# Patient Record
Sex: MALE | Race: WHITE | URBAN - METROPOLITAN AREA
[De-identification: names, ages, dates, MRNs, and addresses within clinical notes are randomized per-mention and may not be internally consistent; named-entity substitution may affect disease eponyms.]

---

## 2018-12-16 ENCOUNTER — HOSPITAL ENCOUNTER (OUTPATIENT)
Facility: CLINIC | Age: 63
End: 2018-12-16
Attending: INTERNAL MEDICINE | Admitting: INTERNAL MEDICINE

## 2018-12-16 ENCOUNTER — HOSPITAL ENCOUNTER (EMERGENCY)
Facility: CLINIC | Age: 63
Discharge: HOME | End: 2018-12-16
Attending: EMERGENCY MEDICINE | Admitting: EMERGENCY MEDICINE
Payer: COMMERCIAL

## 2018-12-16 ENCOUNTER — APPOINTMENT (OUTPATIENT)
Dept: GENERAL RADIOLOGY | Facility: CLINIC | Age: 63
End: 2018-12-16
Attending: EMERGENCY MEDICINE
Payer: COMMERCIAL

## 2018-12-16 VITALS
OXYGEN SATURATION: 97 % | BODY MASS INDEX: 23.54 KG/M2 | WEIGHT: 150 LBS | HEART RATE: 56 BPM | HEIGHT: 67 IN | RESPIRATION RATE: 18 BRPM | SYSTOLIC BLOOD PRESSURE: 130 MMHG | DIASTOLIC BLOOD PRESSURE: 74 MMHG | TEMPERATURE: 98 F

## 2018-12-16 DIAGNOSIS — T18.9XXA SWALLOWED FOREIGN BODY, INITIAL ENCOUNTER: ICD-10-CM

## 2018-12-16 DIAGNOSIS — T18.2XXA FOREIGN BODY IN STOMACH: ICD-10-CM

## 2018-12-16 LAB — UPPER GI ENDOSCOPY: NORMAL

## 2018-12-16 PROCEDURE — 71046 X-RAY EXAM CHEST 2 VIEWS: CPT

## 2018-12-16 PROCEDURE — 74019 RADEX ABDOMEN 2 VIEWS: CPT

## 2018-12-16 PROCEDURE — 99285 EMERGENCY DEPT VISIT HI MDM: CPT | Mod: Z6 | Performed by: EMERGENCY MEDICINE

## 2018-12-16 PROCEDURE — 25000132 ZZH RX MED GY IP 250 OP 250 PS 637: Performed by: INTERNAL MEDICINE

## 2018-12-16 PROCEDURE — 25000128 H RX IP 250 OP 636: Performed by: INTERNAL MEDICINE

## 2018-12-16 PROCEDURE — G0500 MOD SEDAT ENDO SERVICE >5YRS: HCPCS | Performed by: INTERNAL MEDICINE

## 2018-12-16 PROCEDURE — 43247 EGD REMOVE FOREIGN BODY: CPT | Performed by: INTERNAL MEDICINE

## 2018-12-16 PROCEDURE — 99285 EMERGENCY DEPT VISIT HI MDM: CPT | Mod: 25

## 2018-12-16 RX ORDER — FENTANYL CITRATE 50 UG/ML
INJECTION, SOLUTION INTRAMUSCULAR; INTRAVENOUS PRN
Status: DISCONTINUED | OUTPATIENT
Start: 2018-12-16 | End: 2018-12-16 | Stop reason: HOSPADM

## 2018-12-16 RX ORDER — SIMETHICONE
LIQUID (ML) MISCELLANEOUS PRN
Status: DISCONTINUED | OUTPATIENT
Start: 2018-12-16 | End: 2018-12-16 | Stop reason: HOSPADM

## 2018-12-16 ASSESSMENT — MIFFLIN-ST. JEOR: SCORE: 1434.03

## 2018-12-16 NOTE — ED AVS SNAPSHOT
[facility], [place], Emergency Department  [address] [place] [id number]  Phone:  [phone]                                    Serafin Garcia   MRN: [id number]    Department:  [facility], Emergency Department   Date of Visit:  12/16/2018           After Visit Summary Signature Page    I have received my discharge instructions, and my questions have been answered. I have discussed any challenges I see with this plan with the nurse or doctor.    ..........................................................................................................................................  Patient/Patient Representative Signature      ..........................................................................................................................................  Patient Representative Print Name and Relationship to Patient    ..................................................               ................................................  Date                                   Time    ..........................................................................................................................................  Reviewed by Signature/Title    ...................................................              ..............................................  Date                                               Time          22EPIC Rev 08/18

## 2018-12-16 NOTE — OR NURSING
Procedure: EGD with foreign body removal. Toothpick was retrieved via snare and grimaldo protector.  Sedation: Conscious  O2: 2L  Tolerated Procedure: Well  Patient returned to recovery room in stable condition with RN  Other: ER called with report  Vicky Ortiz RN

## 2018-12-16 NOTE — DISCHARGE INSTRUCTIONS
Please make an appointment to follow up with Your Primary Care Provider as needed.    Return to the emergency department for any problems.

## 2018-12-16 NOTE — ED NOTES
"Bed: ED23  Expected date:   Expected time:   Means of arrival:   Comments:  Hold for patient in endoscopy.     Serafin Garcia  MRN [id number]    Had tooth pick removed. Left plastic \"tie\" in the stomach.     Received 2 mg Versed and 50 mcg of Fentanyl.       "

## 2018-12-16 NOTE — ED PROVIDER NOTES
"  History     Chief Complaint   Patient presents with     Swallowed Foreign Body     HPI  Serafin Garcia is a 63 year old male who presents for evaluation of a swallowed foreign body. Patient believes he swallowed a wooden, double-pointed toothpick -- \"one moment it was on the plate, the next moment I felt something sharp in my throat and poke my glottis\". Currently, he is asymptomatic, denying hemoptysis or melena (though he notes he has yet to pass stool since). Patient called the \"[facility]\" and was urged to seek emergent care.    No past medical history on file.    No past surgical history on file.    No family history on file.    Social History     Tobacco Use     Smoking status: Never Smoker     Smokeless tobacco: Never Used   Substance Use Topics     Alcohol use: Yes     Comment: socially       No current facility-administered medications for this encounter.      No current outpatient medications on file.        Allergies   Allergen Reactions     Nsaids      Penicillins      I have reviewed the Medications, Allergies, Past Medical and Surgical History, and Social History in the Epic system.    Review of Systems   All other systems reviewed and are negative.      Physical Exam   BP: 128/76  Pulse: 59  Heart Rate: 54  Temp: 98  F (36.7  C)  Resp: 12  Height: 170.2 cm (5' 7\")  Weight: 68 kg (150 lb)  SpO2: 100 %      Physical Exam   Constitutional: He is oriented to person, place, and time. He appears well-developed and well-nourished.  Non-toxic appearance. He does not appear ill. No distress.   HENT:   Head: Normocephalic and atraumatic.   Eyes: EOM are normal. Pupils are equal, round, and reactive to light. No scleral icterus.   Neck: Normal range of motion. Neck supple.   Cardiovascular: Normal rate.   Pulmonary/Chest: Effort normal and breath sounds normal. No stridor. He has no wheezes.   Abdominal: Soft. Bowel sounds are normal. There is no tenderness. There is no rebound and no guarding. "   Neurological: He is alert and oriented to person, place, and time. He has normal strength. No sensory deficit.   Skin: Skin is warm and dry. No rash noted. No pallor.   Psychiatric: He has a normal mood and affect. His behavior is normal.   Nursing note and vitals reviewed.      ED Course        Procedures       9:17 AM  The patient was seen and examined by Dr. Bridges in Room 23.     Results for orders placed or performed during the hospital encounter of 12/16/18   UPPER GI ENDOSCOPY   Result Value Ref Range    Upper GI Endoscopy       [address] [place] [address] [phone]     Endoscopy Department  _______________________________________________________________________________  Patient Name: Serafin Garcia              Procedure Date: 12/16/2018 1:59 PM  MRN: [id number]                       Account Number: [id number]  YOB: 1955               Admit Type: Inpatient  Age: 63                               Room: [facility]  Gender: Male                          Note Status: Finalized  Attending MD: Reji Burk MD        Total Sedation Time:   _______________________________________________________________________________     Procedure:           Upper GI endoscopy  Indications:         Foreign body in the stomach  Providers:           Reji Burk MD, Lily Mehta MD, Edita Younger RN, Vicky Ortiz RN  Referring MD:        Flaco Bridges MD  Medicines:           Fentanyl 50 micrograms IV, Midazolam 2 mg IV  Comp lications:       No immediate complications.  _______________________________________________________________________________  Procedure:           Pre-Anesthesia Assessment:                       - Prior to the procedure, a History and Physical was                        performed, and patient medications and allergies were                        reviewed. The patient is competent. The risks and                         benefits of the procedure and the sedation options and                        risks were discussed with the patient. All questions                        were answered and informed consent was obtained. Patient                        identification and proposed procedure were verified by                        the physician and the nurse in the pre-procedure area.                        Mental Status Examination: alert and oriented. Airway                        Examination: normal oropharyngeal airway and neck                        mobility. Respiratory Examination: cl ear to                        auscultation. CV Examination: normal. Prophylactic                        Antibiotics: The patient does not require prophylactic                        antibiotics. Prior Anticoagulants: The patient has taken                        no previous anticoagulant or antiplatelet agents. ASA                        Grade Assessment: II - A patient with mild systemic                        disease. After reviewing the risks and benefits, the                        patient was deemed in satisfactory condition to undergo                        the procedure. The anesthesia plan was to use moderate                        sedation / analgesia (conscious sedation). Immediately                        prior to administration of medications, the patient was                        re-assessed for adequacy to receive sedatives. The heart                        rate, respiratory rate, oxygen saturations, blood                        pressure, adequacy of pulmonary ventilation , and                        response to care were monitored throughout the                        procedure. The physical status of the patient was                        re-assessed after the procedure.                       After obtaining informed consent, the endoscope was                        passed under direct vision. Throughout the  procedure,                        the patient's blood pressure, pulse, and oxygen                        saturations were monitored continuously. The Endoscope                        was introduced through the mouth, and advanced to the                        second part of duodenum. The upper GI endoscopy was                        accomplished without difficulty. The patient tolerated                        the procedure well.                                                                                   Findings:       The examined esophagus was normal.       The Z-line was regular and was found 40 cm from the incisors.       Tooth pick were  found in the gastric fundus. Removal was accomplished        with a snare and a grimaldo.       The cardia, gastric body, gastric antrum and pylorus were normal.       The duodenal bulb and second portion of the duodenum were normal.                                                                                   Impression:          - Normal esophagus.                       - Z-line regular, 40 cm from the incisors.                       - Tooth pick was found in the stomach. Removal was                        successful.                       - Normal cardia, gastric body, antrum and pylorus.                       - Normal duodenal bulb and second portion of the                        duodenum.  Recommendation:      - Discharge patient to home (with escort) from the ED.                       - Resume previous diet.                       - Continue present medications.                       - Findings were discussed with the patient soon after                        the procedure.                                                                                      Electronically signed by: Reji Burk  ________________  Reji Burk MD  12/16/2018 3:01:20 PM  I was physically present for the entire viewing portion of the exam.  __________________________  Signature of  teaching physician  [id number] MD Wm    _________________________  Lily Mehta MD  Number of Addenda: 0    Note Initiated On: 12/16/2018 1:59 PM     Abdomen XR, 2 vw, flat and upright    Narrative    Exam: Abdominal x-ray, supine and upright views, 12/16/2018.    COMPARISON: No similar study.    HISTORY: Swallowed toothpick.    FINDINGS/    Impression    impression: Supine and upright views of the abdomen were obtained. No  free air beneath the diaphragm. Lung bases are clear. Nonobstructive  bowel gas pattern. No pneumatosis or portal venous gas. Moderate  colonic stool burden. 5 mm tubular radiodense focus in the left  pelvis, nonspecific.    NEREIDA ANDREWS MD   Chest XR,  PA & LAT    Narrative    Exam: Chest x-ray, 2 views, 12/16/2018.    COMPARISON: No similar study.    HISTORY: Swallowed toothpick.    FINDINGS: PA and lateral views of the chest were obtained. No acute  airspace opacities. The pulmonary vasculature is distinct. No  radiodense foreign objects are seen. Cardiomediastinal silhouette  within normal limits. No pleural effusions. No pneumothorax.      Impression    IMPRESSION: No acute airspace opacities. No radiodense foreign objects  are seen.    NEREIDA ANDREWS MD            Assessments & Plan (with Medical Decision Making)   This patient presented to  the emergency department after swallowing a toothpick yesterday.  He appeared in no distress and x-rays did not demonstrate obvious evidence of retained foreign body, but as this was a wooden toothpick it may easily be missed on x-rays.  I did consult with GI who recommended performing an endoscopy to see if the toothpick was still in the stomach.  Patient was sent down to endoscopy and toothpick was recovered.  The procedure was without complications and patient was sent back to the emergency department and observed for a period of time after which she was discharged with instructions for care and follow-up in good  condition.    This part of the document was transcribed by Zaid Villatoro, Medical Scribe.     I have reviewed the nursing notes.    I have reviewed the findings, diagnosis, plan and need for follow up with the patient.       Medication List      There are no discharge medications for this visit.         Final diagnoses:   Swallowed foreign body, initial encounter   I, Zaid Villatoro, am serving as a trained medical scribe to document services personally performed by Geoff Bridges MD, based on the provider's statements to me.   I, Geoff Bridges MD, was physically present and have reviewed and verified the accuracy of this note documented by Zaid Villatoro.      12/16/2018   [facility], [place], EMERGENCY DEPARTMENT     Flaco Bridges MD  12/17/18 [id number]

## 2018-12-16 NOTE — ED TRIAGE NOTES
Arrived to ED d/t swallowing a foreign body, ate an olive that had a piece of toothpick on it last night, does not still feel like it is stuck in throat but is concerned about object puncturing GI tract

## 2018-12-16 NOTE — CONSULTS
GASTROENTEROLOGY CONSULTATION      Date of Admission:  12/16/2018          ASSESSMENT AND RECOMMENDATIONS:   Assessment:  Serafin Garcia is a 63 year old male with a past medical history of hyperlipidemia who is presenting 12 hours after swallowing a toothpick.     1. Foreign Body   ASGE guidelines suggest endoscopic removal so will proceed with EGD.     Recommendations:   - NPO   - EGD today for foreign body removal        - Toothpick identified in stomach, removed with grimaldo and 9 mm snare; second look revealed no mucosal damage and remainder of exam normal   - Gastroenterology team will continue to follow with you.    Gastroenterology follow up recommendations: None needed.    Thank you for involving us in this patient's care. Please do not hesitate to contact the GI service with any questions or concerns.     Pt care plan discussed with Dr. Burk, GI staff physician.    Lily Mehta MD  Gastroenterology Fellow  [id number]  -------------------------------------------------------------------------------------------------------------------          Chief Complaint:   We were asked by Dr. Bridges of Emergency Medicine to evaluate this patient with swallowed foreign body.     History is obtained from the patient and the medical record.          History of Present Illness:   Serafin Garcia is a 63 year old male with a past medical history of hyperlipidemia who is presenting 12 hours after swallowing a toothpick.     Patient reports he was at a dinner party and inadvertently swallowed a deep fried olive with a toothpick. It hurt going down, but is no longer having any symptoms. No fevers, chills, chest pain, shortness of breath, abdominal pain, nausea, vomiting, skin rash, jaundice.    In the ED, patient with stable VSS, AXR and CXR obtained without free air or evidence of foreign body. GI is consulted for further evaluation and management of foreign body/consideration of EGD.             Past Medical History:  "  Reviewed and edited as appropriate  No past medical history on file.         Past Surgical History:   Reviewed and edited as appropriate   No past surgical history on file.         Previous Endoscopy:   No results found for this or any previous visit.         Social History:   Reviewed and edited as appropriate  Social History     Socioeconomic History     Marital status:      Spouse name: Not on file     Number of children: Not on file     Years of education: Not on file     Highest education level: Not on file   Social Needs     Financial resource strain: Not on file     Food insecurity - worry: Not on file     Food insecurity - inability: Not on file     Transportation needs - medical: Not on file     Transportation needs - non-medical: Not on file   Occupational History     Not on file   Tobacco Use     Smoking status: Never Smoker     Smokeless tobacco: Never Used   Substance and Sexual Activity     Alcohol use: Yes     Comment: socially     Drug use: No     Sexual activity: Not Currently   Other Topics Concern     Not on file   Social History Narrative     Not on file            Family History:   Reviewed and edited as appropriate  No family history on file.        Allergies:   Reviewed and edited as appropriate    Allergies   Allergen Reactions     Nsaids      Penicillins           Medications:     Statin         Review of Systems:   A complete review of systems was performed and is negative except as noted in the HPI           Physical Exam:   /85   Pulse 59   Temp 98  F (36.7  C) (Oral)   Resp 15   Ht 1.702 m (5' 7\")   Wt 68 kg (150 lb)   SpO2 97%   BMI 23.49 kg/m    Wt:   Wt Readings from Last 2 Encounters:   12/16/18 68 kg (150 lb)      Constitutional: cooperative, pleasant, not dyspneic/diaphoretic, no acute distress  Eyes: Sclera anicteric/injected  Ears/nose/mouth/throat: Normal oropharynx without ulcers or exudate, mucus membranes moist, hearing intact  Neck: supple, thyroid " normal size  CV: No edema  Respiratory: Unlabored breathing  Lymph: No axillary, submandibular, supraclavicular lymphadenopathy  Abd: Nondistended, no hepatosplenomegaly, nontender, no peritoneal signs  Skin: warm, perfused, no jaundice  Neuro: AAO x 3  Psych: Normal affect  MSK: Normal gait         Data:   Labs and imaging below were independently reviewed and interpreted    BMPNo lab results found in last 7 days.  CBCNo lab results found in last 7 days.  INRNo lab results found in last 7 days.  LFTsNo lab results found in last 7 days.   PANCNo lab results found in last 7 days.    Imaging:  AXR 12/16/18  impression: Supine and upright views of the abdomen were obtained. No  free air beneath the diaphragm. Lung bases are clear. Nonobstructive  bowel gas pattern. No pneumatosis or portal venous gas. Moderate  colonic stool burden. 5 mm tubular radiodense focus in the left  pelvis, nonspecific.

## (undated) RX ORDER — SIMETHICONE 20 MG/.3ML
EMULSION ORAL
Status: DISPENSED
Start: 2018-12-16

## (undated) RX ORDER — FENTANYL CITRATE 50 UG/ML
INJECTION, SOLUTION INTRAMUSCULAR; INTRAVENOUS
Status: DISPENSED
Start: 2018-12-16

## (undated) RX ORDER — DIPHENHYDRAMINE HYDROCHLORIDE 50 MG/ML
INJECTION INTRAMUSCULAR; INTRAVENOUS
Status: DISPENSED
Start: 2018-12-16